# Patient Record
Sex: MALE | Race: ASIAN | Employment: FULL TIME | URBAN - METROPOLITAN AREA
[De-identification: names, ages, dates, MRNs, and addresses within clinical notes are randomized per-mention and may not be internally consistent; named-entity substitution may affect disease eponyms.]

---

## 2017-09-20 ENCOUNTER — OFFICE VISIT (OUTPATIENT)
Dept: FAMILY MEDICINE CLINIC | Age: 27
End: 2017-09-20

## 2017-09-20 VITALS
OXYGEN SATURATION: 96 % | BODY MASS INDEX: 28.25 KG/M2 | TEMPERATURE: 99.1 F | HEART RATE: 116 BPM | HEIGHT: 67 IN | SYSTOLIC BLOOD PRESSURE: 122 MMHG | RESPIRATION RATE: 22 BRPM | WEIGHT: 180 LBS | DIASTOLIC BLOOD PRESSURE: 78 MMHG

## 2017-09-20 RX ORDER — DIPHENHYDRAMINE HCL 25 MG
25 CAPSULE ORAL
COMMUNITY

## 2017-09-20 RX ORDER — IBUPROFEN 600 MG/1
TABLET ORAL
COMMUNITY
End: 2019-05-21 | Stop reason: DRUGHIGH

## 2017-09-20 NOTE — PROGRESS NOTES
Alexis Serrano is a 32 y.o. male  1. Have you been to the ER, urgent care clinic or hospitalized since your last visit? NO.     2. Have you seen or consulted any other health care providers outside of the [address] [place] [address] since your last visit (Include any pap smears or colon screening)? NO      Do you have an Advanced Directive? NO    Would you like information on Advanced Directives?  NO

## 2019-05-21 ENCOUNTER — OFFICE VISIT (OUTPATIENT)
Dept: FAMILY MEDICINE CLINIC | Age: 29
End: 2019-05-21

## 2019-05-21 VITALS
DIASTOLIC BLOOD PRESSURE: 78 MMHG | RESPIRATION RATE: 16 BRPM | WEIGHT: 191 LBS | OXYGEN SATURATION: 98 % | SYSTOLIC BLOOD PRESSURE: 120 MMHG | BODY MASS INDEX: 29.98 KG/M2 | HEART RATE: 86 BPM | HEIGHT: 67 IN | TEMPERATURE: 98.4 F

## 2019-05-21 DIAGNOSIS — Z83.3 FAMILY HISTORY OF DIABETES MELLITUS (DM): ICD-10-CM

## 2019-05-21 DIAGNOSIS — J45.990 EXERCISE-INDUCED ASTHMA: Primary | ICD-10-CM

## 2019-05-21 DIAGNOSIS — R53.82 CHRONIC FATIGUE: ICD-10-CM

## 2019-05-21 DIAGNOSIS — Z83.438 FAMILY HISTORY OF HYPERLIPIDEMIA: ICD-10-CM

## 2019-05-21 DIAGNOSIS — Z87.892 HISTORY OF ANAPHYLAXIS: ICD-10-CM

## 2019-05-21 DIAGNOSIS — G89.29 CHRONIC MIDLINE LOW BACK PAIN WITHOUT SCIATICA: ICD-10-CM

## 2019-05-21 DIAGNOSIS — M54.50 CHRONIC MIDLINE LOW BACK PAIN WITHOUT SCIATICA: ICD-10-CM

## 2019-05-21 DIAGNOSIS — N52.9 ERECTILE DYSFUNCTION, UNSPECIFIED ERECTILE DYSFUNCTION TYPE: ICD-10-CM

## 2019-05-21 RX ORDER — ALBUTEROL SULFATE 90 UG/1
2 AEROSOL, METERED RESPIRATORY (INHALATION)
Qty: 1 INHALER | Refills: 0 | Status: SHIPPED | OUTPATIENT
Start: 2019-05-21

## 2019-05-21 RX ORDER — EPINEPHRINE 0.3 MG/.3ML
0.3 INJECTION SUBCUTANEOUS
Qty: 1 SYRINGE | Refills: 1 | Status: SHIPPED | OUTPATIENT
Start: 2019-05-21 | End: 2019-05-21

## 2019-05-21 RX ORDER — CYCLOBENZAPRINE HCL 10 MG
10 TABLET ORAL
Qty: 20 TAB | Refills: 0 | Status: SHIPPED | OUTPATIENT
Start: 2019-05-21 | End: 2022-05-18

## 2019-05-21 RX ORDER — FLUTICASONE PROPIONATE 50 MCG
2 SPRAY, SUSPENSION (ML) NASAL DAILY
Qty: 1 BOTTLE | Refills: 0 | Status: SHIPPED | OUTPATIENT
Start: 2019-05-21 | End: 2022-05-18

## 2019-05-21 RX ORDER — IBUPROFEN 800 MG/1
800 TABLET ORAL
Qty: 30 TAB | Refills: 0 | Status: SHIPPED | OUTPATIENT
Start: 2019-05-21

## 2019-05-21 NOTE — PATIENT INSTRUCTIONS
Low Back Pain: Exercises Your Care Instructions Here are some examples of typical rehabilitation exercises for your condition. Start each exercise slowly. Ease off the exercise if you start to have pain. Your doctor or physical therapist will tell you when you can start these exercises and which ones will work best for you. How to do the exercises Press-up 1. Lie on your stomach, supporting your body with your forearms. 2. Press your elbows down into the floor to raise your upper back. As you do this, relax your stomach muscles and allow your back to arch without using your back muscles. As your press up, do not let your hips or pelvis come off the floor. 3. Hold for 15 to 30 seconds, then relax. 4. Repeat 2 to 4 times. Alternate arm and leg (bird dog) exercise 1. Start on the floor, on your hands and knees. 2. Tighten your belly muscles. 3. Raise one leg off the floor, and hold it straight out behind you. Be careful not to let your hip drop down, because that will twist your trunk. 4. Hold for about 6 seconds, then lower your leg and switch to the other leg. 5. Repeat 8 to 12 times on each leg. 6. Over time, work up to holding for 10 to 30 seconds each time. 7. If you feel stable and secure with your leg raised, try raising the opposite arm straight out in front of you at the same time. Knee-to-chest exercise 1. Lie on your back with your knees bent and your feet flat on the floor. 2. Bring one knee to your chest, keeping the other foot flat on the floor (or keeping the other leg straight, whichever feels better on your lower back). 3. Keep your lower back pressed to the floor. Hold for at least 15 to 30 seconds. 4. Relax, and lower the knee to the starting position. 5. Repeat with the other leg. Repeat 2 to 4 times with each leg. 6. To get more stretch, put your other leg flat on the floor while pulling your knee to your chest. 
 
Curl-ups 1. Lie on the floor on your back with your knees bent at a 90-degree angle. Your feet should be flat on the floor, about 12 inches from your buttocks. 2. Cross your arms over your chest. If this bothers your neck, try putting your hands behind your neck (not your head), with your elbows spread apart. 3. Slowly tighten your belly muscles and raise your shoulder blades off the floor. 4. Keep your head in line with your body, and do not press your chin to your chest. 
5. Hold this position for 1 or 2 seconds, then slowly lower yourself back down to the floor. 6. Repeat 8 to 12 times. Pelvic tilt exercise 1. Lie on your back with your knees bent. 2. \"Brace\" your stomach. This means to tighten your muscles by pulling in and imagining your belly button moving toward your spine. You should feel like your back is pressing to the floor and your hips and pelvis are rocking back. 3. Hold for about 6 seconds while you breathe smoothly. 4. Repeat 8 to 12 times. Heel dig bridging 1. Lie on your back with both knees bent and your ankles bent so that only your heels are digging into the floor. Your knees should be bent about 90 degrees. 2. Then push your heels into the floor, squeeze your buttocks, and lift your hips off the floor until your shoulders, hips, and knees are all in a straight line. 3. Hold for about 6 seconds as you continue to breathe normally, and then slowly lower your hips back down to the floor and rest for up to 10 seconds. 4. Do 8 to 12 repetitions. Hamstring stretch in doorway 1. Lie on your back in a doorway, with one leg through the open door. 2. Slide your leg up the wall to straighten your knee. You should feel a gentle stretch down the back of your leg. 3. Hold the stretch for at least 15 to 30 seconds. Do not arch your back, point your toes, or bend either knee. Keep one heel touching the floor and the other heel touching the wall. 4. Repeat with your other leg. 5. Do 2 to 4 times for each leg. Hip flexor stretch 1. Kneel on the floor with one knee bent and one leg behind you. Place your forward knee over your foot. Keep your other knee touching the floor. 2. Slowly push your hips forward until you feel a stretch in the upper thigh of your rear leg. 3. Hold the stretch for at least 15 to 30 seconds. Repeat with your other leg. 4. Do 2 to 4 times on each side. Wall sit 1. Stand with your back 10 to 12 inches away from a wall. 2. Lean into the wall until your back is flat against it. 3. Slowly slide down until your knees are slightly bent, pressing your lower back into the wall. 4. Hold for about 6 seconds, then slide back up the wall. 5. Repeat 8 to 12 times. Follow-up care is a key part of your treatment and safety. Be sure to make and go to all appointments, and call your doctor if you are having problems. It's also a good idea to know your test results and keep a list of the medicines you take. Where can you learn more? Go to http://lissy-butch.info/. Enter D654 in the search box to learn more about \"Low Back Pain: Exercises. \" Current as of: September 20, 2018 Content Version: 11.9 © 8363-5369 [employer]. Care instructions adapted under license by [employer] (which disclaims liability or warranty for this information). If you have questions about a medical condition or this instruction, always ask your healthcare professional. [place] [address] any warranty or liability for your use of this information.

## 2019-05-21 NOTE — PROGRESS NOTES
1. Have you been to the ER, urgent care clinic since your last visit? Hospitalized since your last visit? No    2. Have you seen or consulted any other health care providers outside of the [address] [place] [address] since your last visit? Include any pap smears or colon screening.  No

## 2019-05-23 NOTE — PROGRESS NOTES
Nelson Schreiber, 29 y.o.,  male    SUBJECTIVE  Establish care    Chronic low back pain - on and off the past 5 years. Has about monthly dull midline ache. Then more severe ones that he has difficulty walking and radiating to either leg about few times a year, usually aggravated by lifting weights with work outs or poor sleep position. 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 Prn nsaids effective. He has discontinued weight lifting due to discomfort. He is pain free past few weeks. Denies paresthesia,weakness, incontinence    C/o chronic fatigue, for several months needs coffee to get through the day. Concerned about difficulty maintaining erection. He has am erections, no issues with ejaculation. He has a long term fiance. He denies sleep problems, mood changes. Eczema- chronic, following derm on dupixent    Asthma-worse during his childhood, has outgrown this throughout adult zamudio. 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 Current Outpatient Medications   Medication Sig Dispense Refill    fluticasone propionate (FLONASE) 50 mcg/actuation nasal spray 2 Sprays by Both Nostrils route daily. 1 Bottle 0    albuterol (PROVENTIL HFA, VENTOLIN HFA, PROAIR HFA) 90 mcg/actuation inhaler Take 2 Puffs by inhalation every four (4) hours as needed for Wheezing. 1 Inhaler 0    cyclobenzaprine (FLEXERIL) 10 mg tablet Take 1 Tab by mouth three (3) times daily as needed for Muscle Spasm(s). 20 Tab 0    ibuprofen (MOTRIN) 800 mg tablet Take 1 Tab by mouth every eight (8) hours as needed for Pain. 30 Tab 0    dupilumab (DUPIXENT) 300 mg/2 mL syrg by SubCUTAneous route.  Cetirizine (ZYRTEC) 10 mg cap Take  by mouth.  diphenhydrAMINE (BENADRYL) 25 mg capsule Take 25 mg by mouth every six (6) hours as needed.          Allergies Allergen Reactions    Nuts [place] Solum Nut] Anaphylaxis    Seafood Anaphylaxis       Past Medical History:   Diagnosis Date    Asthma     Cramps, muscle, general     Eczema     Fx 1st metacarp base-closed     bilateral    Sciatica        Social History     Socioeconomic History    Marital status: SINGLE     Spouse name: Not on file    Number of children: Not on file    Years of education: Not on file    Highest education level: Not on file   Occupational History    Not on file   Social Needs    Financial resource strain: Not on file    Food insecurity:     Worry: Not on file     Inability: Not on file    Transportation needs:     Medical: Not on file     Non-medical: Not on file   Tobacco Use    Smoking status: Never Smoker    Smokeless tobacco: Never Used   Substance and Sexual Activity    Alcohol use: Yes     Comment: casual drinker    Drug use: No    Sexual activity: Yes     Partners: Female     Birth control/protection: Condom   Lifestyle    Physical activity:     Days per week: Not on file     Minutes per session: Not on file    Stress: Not on file   Relationships    Social connections:     Talks on phone: Not on file     Gets together: Not on file     Attends Rastafari service: Not on file     Active member of club or organization: Not on file     Attends meetings of clubs or organizations: Not on file     Relationship status: Not on file    Intimate partner violence:     Fear of current or ex partner: Not on file     Emotionally abused: Not on file     Physically abused: Not on file     Forced sexual activity: Not on file   Other Topics Concern    Not on file   Social History Narrative    Not on file       Family History   Problem Relation Age of Onset    No Known Problems Mother     Diabetes Father     Hypertension Father          OBJECTIVE    Physical Exam:     Visit Vitals  /78 (BP 1 Location: Left arm, BP Patient Position: Sitting)   Pulse 86   Temp 98.4 °F (36.9 °C) (Oral) Resp 16   Ht 5' 6.5\" (1.689 m)   Wt 191 lb (86.6 kg)   SpO2 98%   BMI 30.37 kg/m²       General: alert, well-appearing, in no apparent distress or pain  Head: atraumatic. Non-tender maxillary and frontal sinuses  Eyes: Lids with no discharge, no matting, conjunctivae clear and non injected, full EOMs, PERLLA  Ears: pinna non-tender, external auditory canal patent, TM intact  Mouth/throat:tonsils non enlarged, pharynx non erythematous and no lesion, nasal mucosa normal  Neck: supple, no adenopathy palpated  CVS: normal rate, regular rhythm, distinct S1 and S2  Lungs:clear to ausculation bilaterally, no crackles, wheezing or rhonchi noted  Back: Lumbosacral spine area reveals no local tenderness or mass. DTR's, motor strength and sensation normal, Peripheral pulses are palpable. Abdomen: normoactive bowel sounds, soft, non-tender  Extremities: no edema, no cyanosis, MSK grossly normal  Skin: + multiple scaly patches  Psych:  mood and affect normal        ASSESSMENT/PLAN  Diagnoses and all orders for this visit:    1. Exercise-induced asthma  -     albuterol (PROVENTIL HFA, VENTOLIN HFA, PROAIR HFA) 90 mcg/actuation inhaler; Take 2 Puffs by inhalation every four (4) hours as needed for Wheezing. 2. History of anaphylaxis  -     EPINEPHrine (EPIPEN) 0.3 mg/0.3 mL injection; 0.3 mL by IntraMUSCular route once as needed for Anaphylaxis for up to 1 dose. 3. Erectile dysfunction, unspecified erectile dysfunction type  -     TESTOSTERONE, FREE & TOTAL; Future  -     LIPID PANEL; Future  -     HEMOGLOBIN A1C W/O EAG; Future  -     METABOLIC PANEL, COMPREHENSIVE; Future    4. Family history of hyperlipidemia  -     LIPID PANEL; Future    5. Family history of diabetes mellitus (DM)  -     HEMOGLOBIN A1C W/O EAG; Future  -     METABOLIC PANEL, COMPREHENSIVE; Future    6. Chronic fatigue  ?  Deconditioning  R/o organic causes  Advised daily exercise, including core strengthening  -     TESTOSTERONE, FREE & TOTAL; Future  -     TSH 3RD GENERATION; Future  -     VITAMIN D, 25 HYDROXY; Future  -     CBC WITH AUTOMATED DIFF; Future    7. Chronic midline low back pain without sciatica  Intermittent  ? myofascial type, given prn flexeril and ibuprofen when symptomatic  -     XR SPINE LUMB 2 OR 3 V; Future  -     REFERRAL TO SPINE SURGERY  -     fluticasone propionate (FLONASE) 50 mcg/actuation nasal spray; 2 Sprays by Both Nostrils route daily. -     cyclobenzaprine (FLEXERIL) 10 mg tablet; Take 1 Tab by mouth three (3) times daily as needed for Muscle Spasm(s). -     ibuprofen (MOTRIN) 800 mg tablet; Take 1 Tab by mouth every eight (8) hours as needed for Pain. Follow-up and Dispositions    · Return in about 1 month (around 6/18/2019), or if symptoms worsen or fail to improve. Patient understands plan of care. Patient has provided input and agrees with goals.

## 2019-06-05 ENCOUNTER — HOSPITAL ENCOUNTER (OUTPATIENT)
Dept: OTHER | Age: 29
Discharge: HOME | End: 2019-06-05
Payer: COMMERCIAL

## 2019-06-05 ENCOUNTER — HOSPITAL ENCOUNTER (OUTPATIENT)
Dept: LAB | Age: 29
Discharge: HOME | End: 2019-06-05
Payer: COMMERCIAL

## 2019-06-05 DIAGNOSIS — G89.29 CHRONIC MIDLINE LOW BACK PAIN WITHOUT SCIATICA: ICD-10-CM

## 2019-06-05 DIAGNOSIS — N52.9 ERECTILE DYSFUNCTION, UNSPECIFIED ERECTILE DYSFUNCTION TYPE: ICD-10-CM

## 2019-06-05 DIAGNOSIS — M54.50 CHRONIC MIDLINE LOW BACK PAIN WITHOUT SCIATICA: ICD-10-CM

## 2019-06-05 DIAGNOSIS — Z83.438 FAMILY HISTORY OF HYPERLIPIDEMIA: ICD-10-CM

## 2019-06-05 DIAGNOSIS — Z83.3 FAMILY HISTORY OF DIABETES MELLITUS (DM): ICD-10-CM

## 2019-06-05 DIAGNOSIS — R53.82 CHRONIC FATIGUE: ICD-10-CM

## 2019-06-05 LAB
25(OH)D3 SERPL-MCNC: 11 NG/ML (ref 30–100)
ALBUMIN SERPL-MCNC: 4.4 G/DL (ref 3.4–5)
ALBUMIN/GLOB SERPL: 1.2 {RATIO} (ref 0.8–1.7)
ALP SERPL-CCNC: 82 U/L (ref 45–117)
ALT SERPL-CCNC: 72 U/L (ref 16–61)
ANION GAP SERPL CALC-SCNC: 8 MMOL/L (ref 3–18)
AST SERPL-CCNC: 23 U/L (ref 15–37)
BASOPHILS # BLD: 0.1 K/UL (ref 0–0.1)
BASOPHILS NFR BLD: 1 % (ref 0–2)
BILIRUB SERPL-MCNC: 0.4 MG/DL (ref 0.2–1)
BUN SERPL-MCNC: 12 MG/DL (ref 7–18)
BUN/CREAT SERPL: 14 (ref 12–20)
CALCIUM SERPL-MCNC: 9.2 MG/DL (ref 8.5–10.1)
CHLORIDE SERPL-SCNC: 103 MMOL/L (ref 100–108)
CHOLEST SERPL-MCNC: 170 MG/DL
CO2 SERPL-SCNC: 31 MMOL/L (ref 21–32)
CREAT SERPL-MCNC: 0.88 MG/DL (ref 0.6–1.3)
DIFFERENTIAL METHOD BLD: ABNORMAL
EOSINOPHIL # BLD: 0.4 K/UL (ref 0–0.4)
EOSINOPHIL NFR BLD: 6 % (ref 0–5)
ERYTHROCYTE [DISTWIDTH] IN BLOOD BY AUTOMATED COUNT: 13.4 % (ref 11.6–14.5)
GLOBULIN SER CALC-MCNC: 3.8 G/DL (ref 2–4)
GLUCOSE SERPL-MCNC: 91 MG/DL (ref 74–99)
HBA1C MFR BLD: 6 % (ref 4.2–5.6)
HCT VFR BLD AUTO: 46.1 % (ref 36–48)
HDLC SERPL-MCNC: 51 MG/DL (ref 40–60)
HDLC SERPL: 3.3 {RATIO} (ref 0–5)
HGB BLD-MCNC: 15 G/DL (ref 13–16)
LDLC SERPL CALC-MCNC: 102.8 MG/DL (ref 0–100)
LIPID PROFILE,FLP: ABNORMAL
LYMPHOCYTES # BLD: 1.9 K/UL (ref 0.9–3.6)
LYMPHOCYTES NFR BLD: 26 % (ref 21–52)
MCH RBC QN AUTO: 27.9 PG (ref 24–34)
MCHC RBC AUTO-ENTMCNC: 32.5 G/DL (ref 31–37)
MCV RBC AUTO: 85.8 FL (ref 74–97)
MONOCYTES # BLD: 0.5 K/UL (ref 0.05–1.2)
MONOCYTES NFR BLD: 7 % (ref 3–10)
NEUTS SEG # BLD: 4.4 K/UL (ref 1.8–8)
NEUTS SEG NFR BLD: 60 % (ref 40–73)
PLATELET # BLD AUTO: 272 K/UL (ref 135–420)
PMV BLD AUTO: 10.5 FL (ref 9.2–11.8)
POTASSIUM SERPL-SCNC: 4.5 MMOL/L (ref 3.5–5.5)
PROT SERPL-MCNC: 8.2 G/DL (ref 6.4–8.2)
RBC # BLD AUTO: 5.37 M/UL (ref 4.7–5.5)
SODIUM SERPL-SCNC: 142 MMOL/L (ref 136–145)
TRIGL SERPL-MCNC: 81 MG/DL (ref ?–150)
TSH SERPL DL<=0.05 MIU/L-ACNC: 1.84 UIU/ML (ref 0.36–3.74)
VLDLC SERPL CALC-MCNC: 16.2 MG/DL
WBC # BLD AUTO: 7.3 K/UL (ref 4.6–13.2)

## 2019-06-05 PROCEDURE — 36415 COLL VENOUS BLD VENIPUNCTURE: CPT

## 2019-06-05 PROCEDURE — 80061 LIPID PANEL: CPT

## 2019-06-05 PROCEDURE — 82306 VITAMIN D 25 HYDROXY: CPT

## 2019-06-05 PROCEDURE — 72100 X-RAY EXAM L-S SPINE 2/3 VWS: CPT

## 2019-06-05 PROCEDURE — 84403 ASSAY OF TOTAL TESTOSTERONE: CPT

## 2019-06-05 PROCEDURE — 84443 ASSAY THYROID STIM HORMONE: CPT

## 2019-06-05 PROCEDURE — 83036 HEMOGLOBIN GLYCOSYLATED A1C: CPT

## 2019-06-05 PROCEDURE — 85025 COMPLETE CBC W/AUTO DIFF WBC: CPT

## 2019-06-05 PROCEDURE — 80053 COMPREHEN METABOLIC PANEL: CPT

## 2019-06-06 LAB
TESTOST FREE SERPL-MCNC: 12.5 PG/ML (ref 9.3–26.5)
TESTOST SERPL-MCNC: 297 NG/DL (ref 264–916)

## 2019-07-02 ENCOUNTER — OFFICE VISIT (OUTPATIENT)
Dept: FAMILY MEDICINE CLINIC | Age: 29
End: 2019-07-02

## 2019-07-02 VITALS
HEIGHT: 67 IN | TEMPERATURE: 98.1 F | WEIGHT: 194 LBS | SYSTOLIC BLOOD PRESSURE: 126 MMHG | RESPIRATION RATE: 16 BRPM | DIASTOLIC BLOOD PRESSURE: 76 MMHG | HEART RATE: 88 BPM | BODY MASS INDEX: 30.45 KG/M2 | OXYGEN SATURATION: 98 %

## 2019-07-02 DIAGNOSIS — R73.03 PREDIABETES: Primary | ICD-10-CM

## 2019-07-02 DIAGNOSIS — G89.29 CHRONIC MIDLINE LOW BACK PAIN WITHOUT SCIATICA: ICD-10-CM

## 2019-07-02 DIAGNOSIS — Z87.892 HISTORY OF ANAPHYLAXIS: ICD-10-CM

## 2019-07-02 DIAGNOSIS — M54.50 CHRONIC MIDLINE LOW BACK PAIN WITHOUT SCIATICA: ICD-10-CM

## 2019-07-02 DIAGNOSIS — J45.990 EXERCISE-INDUCED ASTHMA: ICD-10-CM

## 2019-07-02 DIAGNOSIS — H10.9 CONJUNCTIVITIS OF BOTH EYES, UNSPECIFIED CONJUNCTIVITIS TYPE: ICD-10-CM

## 2019-07-02 DIAGNOSIS — N52.9 ERECTILE DYSFUNCTION, UNSPECIFIED ERECTILE DYSFUNCTION TYPE: ICD-10-CM

## 2019-07-02 DIAGNOSIS — E55.9 VITAMIN D DEFICIENCY: ICD-10-CM

## 2019-07-02 RX ORDER — ERGOCALCIFEROL 1.25 MG/1
50000 CAPSULE ORAL
Qty: 12 CAP | Refills: 0 | Status: SHIPPED | OUTPATIENT
Start: 2019-07-02 | End: 2019-07-02 | Stop reason: SDUPTHER

## 2019-07-02 RX ORDER — TOBRAMYCIN AND DEXAMETHASONE 3; 1 MG/ML; MG/ML
SUSPENSION/ DROPS OPHTHALMIC
Refills: 0 | COMMUNITY
Start: 2019-05-29 | End: 2022-05-18

## 2019-07-02 RX ORDER — ERGOCALCIFEROL 1.25 MG/1
50000 CAPSULE ORAL
Qty: 12 CAP | Refills: 0 | Status: SHIPPED | OUTPATIENT
Start: 2019-07-02 | End: 2022-05-18

## 2019-07-02 RX ORDER — SILDENAFIL 50 MG/1
50 TABLET, FILM COATED ORAL AS NEEDED
Qty: 12 TAB | Refills: 1 | Status: SHIPPED | OUTPATIENT
Start: 2019-07-02 | End: 2022-05-18

## 2019-07-02 NOTE — PROGRESS NOTES
Hugo Monterroso, 29 y.o.,  male    SUBJECTIVE  Ff-up 2nd visit    Reviewed labs c/w prediabetes- he reports poor diet, high carb ramen/rice. He is sedentary, mostly due to back pain. Chronic low back pain - on and off the past 5 years. Has about monthly dull midline ache. Then more severe ones that he has difficulty walking and radiating to either leg about few times a year, usually aggravated by lifting weights with work outs or poor sleep position. 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 Prn nsaids effective. He has discontinued weight lifting due to discomfort. He is pain free past few weeks. Denies paresthesia,weakness, incontinence  Xray shows mild degenerative changes l4-l5 and l5-s1, he has upcoming appt with dr. Jose Kay in august.     C/o chronic fatigue, for several months needs coffee to get through the day. Concerned about difficulty maintaining erection. He has am erections, no issues with ejaculation. He has a long term fiance. He denies sleep problems, mood changes. Vit D noted to be low, normal cbc/thyroid    Eczema- chronic, following derm on dupixent    Asthma-worse during his childhood, has outgrown this throughout adult zamudio. 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 No deep eye pain, no discharge. ROS:  See HPI, all others negative        There is no problem list on file for this patient.       Current Outpatient Medications   Medication Sig Dispense Refill    tobramycin-dexamethasone (TOBRADEX) ophthalmic suspension PLACE 1 DROP IN BOTH EYES 3 TIMES DAILY X 3 DAYS, THEN 1 DROP IN BOTH EYES TWICE DAILY X 3 DAYS  0    sildenafil citrate (VIAGRA) 50 mg tablet Take 1 Tab by mouth as needed (ED). 12 Tab 1    ergocalciferol (ERGOCALCIFEROL) 50,000 unit capsule Take 1 Cap by mouth every seven (7) days. 12 Cap 0    albuterol (PROVENTIL HFA, VENTOLIN HFA, PROAIR HFA) 90 mcg/actuation inhaler Take 2 Puffs by inhalation every four (4) hours as needed for Wheezing. 1 Inhaler 0    dupilumab (DUPIXENT) 300 mg/2 mL syrg by SubCUTAneous route.  Cetirizine (ZYRTEC) 10 mg cap Take  by mouth.  diphenhydrAMINE (BENADRYL) 25 mg capsule Take 25 mg by mouth every six (6) hours as needed.  fluticasone propionate (FLONASE) 50 mcg/actuation nasal spray 2 Sprays by Both Nostrils route daily. 1 Bottle 0    cyclobenzaprine (FLEXERIL) 10 mg tablet Take 1 Tab by mouth three (3) times daily as needed for Muscle Spasm(s). 20 Tab 0    ibuprofen (MOTRIN) 800 mg tablet Take 1 Tab by mouth every eight (8) hours as needed for Pain.  30 Tab 0       Allergies   Allergen Reactions    Nuts [Tree Nut] Anaphylaxis    Seafood Anaphylaxis       Past Medical History:   Diagnosis Date    Asthma     Cramps, muscle, general     Eczema     Fx 1st metacarp base-closed     bilateral    Sciatica        Social History     Socioeconomic History    Marital status: SINGLE     Spouse name: Not on file    Number of children: Not on file    Years of education: Not on file    Highest education level: Not on file   Occupational History    Not on file   Social Needs    Financial resource strain: Not on file    Food insecurity:     Worry: Not on file     Inability: Not on file    Transportation needs:     Medical: Not on file     Non-medical: Not on file   Tobacco Use    Smoking status: Never Smoker    Smokeless tobacco: Never Used   Substance and Sexual Activity    Alcohol use: Yes     Comment: casual drinker    Drug use: No    Sexual activity: Yes     Partners: Female     Birth control/protection: Condom   Lifestyle    Physical activity:     Days per week: Not on file     Minutes per session: Not on file    Stress: Not on file   Relationships    Social connections:     Talks on phone: Not on file     Gets together: Not on file     Attends Rastafari service: Not on file     Active member of club or organization: Not on file     Attends meetings of clubs or organizations: Not on file     Relationship status: Not on file    Intimate partner violence:     Fear of current or ex partner: Not on file     Emotionally abused: Not on file     Physically abused: Not on file     Forced sexual activity: Not on file   Other Topics Concern    Not on file   Social History Narrative    Not on file       Family History   Problem Relation Age of Onset    No Known Problems Mother     Diabetes Father     Hypertension Father          OBJECTIVE    Physical Exam:     Visit Vitals  /76 (BP 1 Location: Left arm, BP Patient Position: Sitting)   Pulse 88   Temp 98.1 °F (36.7 °C) (Oral)   Resp 16   Ht 5' 6.5\" (1.689 m)   Wt 194 lb (88 kg)   SpO2 98%   BMI 30.84 kg/m²       General: alert, well-appearing, in no apparent distress or pain  Head: atraumatic.  Non-tender maxillary and frontal sinuses  Eyes: Lids with no discharge, no matting, conjunctivae b/l injected ,  full EOMs, PERLLA  Ears: pinna non-tender, external auditory canal patent, TM intact  Mouth/throat:tonsils non enlarged, pharynx non erythematous and no lesion, nasal mucosa normal  Neck: supple, no adenopathy palpated  CVS: normal rate, regular rhythm, distinct S1 and S2  Lungs:clear to ausculation bilaterally, no crackles, wheezing or rhonchi  Skin: + multiple scaly patches  Psych:  mood and affect normal    Results for orders placed or performed during the hospital encounter of 06/05/19   CBC WITH AUTOMATED DIFF   Result Value Ref Range    WBC 7.3 4.6 - 13.2 K/uL    RBC 5.37 4.70 - 5.50 M/uL    HGB 15.0 13.0 - 16.0 g/dL    HCT 46.1 36.0 - 48.0 %    MCV 85.8 74.0 - 97.0 FL    MCH 27.9 24.0 - 34.0 PG    MCHC 32.5 31.0 - 37.0 g/dL    RDW 13.4 11.6 - 14.5 %    PLATELET 427 135 - 420 K/uL    MPV 10.5 9.2 - 11.8 FL    NEUTROPHILS 60 40 - 73 %    LYMPHOCYTES 26 21 - 52 %    MONOCYTES 7 3 - 10 %    EOSINOPHILS 6 (H) 0 - 5 %    BASOPHILS 1 0 - 2 %    ABS. NEUTROPHILS 4.4 1.8 - 8.0 K/UL    ABS. LYMPHOCYTES 1.9 0.9 - 3.6 K/UL    ABS. MONOCYTES 0.5 0.05 - 1.2 K/UL    ABS. EOSINOPHILS 0.4 0.0 - 0.4 K/UL    ABS. BASOPHILS 0.1 0.0 - 0.1 K/UL    DF AUTOMATED     LIPID PANEL   Result Value Ref Range    LIPID PROFILE          Cholesterol, total 170 <200 MG/DL    Triglyceride 81 <150 MG/DL    HDL Cholesterol 51 40 - 60 MG/DL    LDL, calculated 102.8 (H) 0 - 100 MG/DL    VLDL, calculated 16.2 MG/DL    CHOL/HDL Ratio 3.3 0 - 5.0     METABOLIC PANEL, COMPREHENSIVE   Result Value Ref Range    Sodium 142 136 - 145 mmol/L    Potassium 4.5 3.5 - 5.5 mmol/L    Chloride 103 100 - 108 mmol/L    CO2 31 21 - 32 mmol/L    Anion gap 8 3.0 - 18 mmol/L    Glucose 91 74 - 99 mg/dL    BUN 12 7.0 - 18 MG/DL    Creatinine 0.88 0.6 - 1.3 MG/DL    BUN/Creatinine ratio 14 12 - 20      GFR est AA >60 >60 ml/min/1.73m2    GFR est non-AA >60 >60 ml/min/1.73m2    Calcium 9.2 8.5 - 10.1 MG/DL    Bilirubin, total 0.4 0.2 - 1.0 MG/DL    ALT (SGPT) 72 (H) 16 - 61 U/L    AST (SGOT) 23 15 - 37 U/L    Alk.  phosphatase 82 45 - 117 U/L    Protein, total 8.2 6.4 - 8.2 g/dL    Albumin 4.4 3.4 - 5.0 g/dL    Globulin 3.8 2.0 - 4.0 g/dL    A-G Ratio 1.2 0.8 - 1.7     TESTOSTERONE, FREE & TOTAL   Result Value Ref Range    Testosterone 297 264 - 916 ng/dL    Free testosterone (Direct) 12.5 9.3 - 26.5 pg/mL   TSH 3RD GENERATION   Result Value Ref Range    TSH 1.84 0.36 - 3.74 uIU/mL   VITAMIN D, 25 HYDROXY   Result Value Ref Range    Vitamin D 25-Hydroxy 11.0 (L) 30 - 100 ng/mL   HEMOGLOBIN A1C W/O EAG   Result Value Ref Range    Hemoglobin A1c 6.0 (H) 4.2 - 5.6 %         ASSESSMENT/PLAN  Diagnoses and all orders for this visit:    Prediabetes  Tlcs, monitoring  Aim for 10-20 lb weight loss, discussed strategies, meal prep/intermittent fasting  Check a1c/bmp in 3 months    Exercise-induced asthma  Controlled  Cont prn albuterol  Offered PCV vaccine he declines  Annual flu vaccine recommended  -     albuterol (PROVENTIL HFA, VENTOLIN HFA, PROAIR HFA) 90 mcg/actuation inhaler; Take 2 Puffs by inhalation every four (4) hours as needed for Wheezing. History of anaphylaxis  -     EPINEPHrine (EPIPEN) 0.3 mg/0.3 mL injection; 0.3 mL by IntraMUSCular route once as needed for Anaphylaxis for up to 1 dose. Erectile dysfunction, unspecified erectile dysfunction type  Testosterone levels and metabolic screen are normal  Trial viagra prn    Vitamin D deficiency  Replete  Ergocalciferol 50,000 iu weekly  Recheck vitamin D in 3 months    Chronic midline low back pain without sciatica  Intermittent  ? myofascial type, given prn flexeril and ibuprofen when symptomatic  DDD on xray  Keep appt with dr. Zeke Quiros in august    Conjunctivitis of both eyes, unspecified conjunctivitis type  Chronic  Referral to ophthalmology        Follow-up and Dispositions    · Return in about 3 months (around 10/2/2019), or if symptoms worsen or fail to improve. Patient understands plan of care. Patient has provided input and agrees with goals.

## 2019-07-02 NOTE — PATIENT INSTRUCTIONS

## 2019-08-06 ENCOUNTER — OFFICE VISIT (OUTPATIENT)
Dept: ORTHOPEDIC SURGERY | Age: 29
End: 2019-08-06

## 2019-08-06 VITALS
TEMPERATURE: 98.3 F | BODY MASS INDEX: 29.4 KG/M2 | HEART RATE: 82 BPM | WEIGHT: 194 LBS | DIASTOLIC BLOOD PRESSURE: 83 MMHG | SYSTOLIC BLOOD PRESSURE: 122 MMHG | HEIGHT: 68 IN | RESPIRATION RATE: 12 BRPM | OXYGEN SATURATION: 98 %

## 2019-08-06 DIAGNOSIS — M47.816 LUMBAR FACET ARTHROPATHY: Primary | ICD-10-CM

## 2019-08-06 DIAGNOSIS — M51.36 DDD (DEGENERATIVE DISC DISEASE), LUMBAR: ICD-10-CM

## 2019-08-06 DIAGNOSIS — M62.838 MUSCLE SPASM: ICD-10-CM

## 2019-08-06 NOTE — LETTER
NOTIFICATION OF RETURN TO WORK / SCHOOL 
 
8/6/2019 4:40 PM 
 
Mr. Blanco Perez [id number] [id number] [address] To Whom It May Concern: 
 
Hugo Roche is under the care of [facility]. We recommend that he have a sit/stand workstation to improve his lumbar spine pain If there are questions or concerns please have the patient contact our office. Sincerely, Charanjit Hassan MD

## 2019-08-06 NOTE — LETTER
8/9/19 Patient: Jigna Thurman YOB: 1990 Date of Visit: 8/6/2019 Benny Person, [address] [id number] [place] [id number] VIA In Basket Dear Benny Person MD, Thank you for referring Mr. Jose Juan Pepper to [address] [place] [address] for evaluation. My notes for this consultation are attached. If you have questions, please do not hesitate to call me. I look forward to following your patient along with you. Sincerely, Renetta Cardona MD

## 2019-08-06 NOTE — PROGRESS NOTES
[facility]  [address] [place], [address]  Phone: [phone]  Fax: [phone]    NEW PATIENT  Pt's YOB: 1990    ASSESSMENT   Diagnoses and all orders for this visit:    1. Lumbar facet arthropathy    2. Muscle spasm    3. DDD (degenerative disc disease), lumbar         IMPRESSION AND PLAN:  Lyndon Katz is a 29 y.o. male with history of low back pain x ~5 years. Pt complains of dull aching lower back pain that occasionally radiates down the left leg. He has not attended physical therapy and is taking ibuprofen as needed. 1) Pt was given information on lumbar arthritis and herniated disc exercises. 2) He was offered a referral to [facility] physical therapy but declined. He wishes to try home exercise first.   3) Pt was given a work note allowing for a sit-stand work station. 4) He may use an inversion table if needed. 5) Pt was counseled on proper lifting mechanics. 6) I recommended the Pt try water exercise and beginner's yoga. 7) I encouraged the patient to exercise regularly, 30 minutes a day, 5 days a week. 8) Mr. Lindsey Osman has a reminder for a \"due or due soon\" health maintenance. I have asked that he contact his primary care provider, Rocky Harris MD, for follow-up on this health maintenance. 9)  demonstrated consistency with prescribing. Follow-up and Dispositions    · Return in about 6 weeks (around 9/17/2019), or if symptoms worsen or fail to improve. HISTORY OF PRESENT ILLNESS:  Hugo Tang is a 29 y.o. male with history of low back pain x ~5 years. Pt presents to the office today as a new patient referred by Rocky Harris MD. Pt complains of dull aching lower back pain that occasionally radiates down the left leg. He reports occasional pain in the left foot when sitting in certain positions.  Pt denies any weakness in the legs, loss of bowel/bladder control, or issues with balance. He reports that he has found himself leaning to the left when walking. Pt suspects that his pain may be related to an exercise injury. He has not attended physical therapy and is taking ibuprofen as needed. He has been prescribed Flexeril by Nidia Bolton MD but admits that he does not take the medication. Pt uses topical steroids for dermatitis. He notes that he recently moved and now has access to a pool. 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 He notes that he changes positions every hour and occasionally stretches as needed. Pt reports stomach cramping when performing 10 minute yoga YouTube exercise videos. Pt at this time desires to proceed with home exercises.       Pain Scale: 4/10     PCP: Nidia Bolton MD    Past Medical History:   Diagnosis Date    Asthma     Cramps, muscle, general     Eczema     Fx 1st metacarp base-closed     bilateral    Sciatica         Social History     Socioeconomic History    Marital status: SINGLE     Spouse name: Not on file    Number of children: Not on file    Years of education: Not on file    Highest education level: Not on file   Occupational History    Not on file   Social Needs    Financial resource strain: Not on file    Food insecurity:     Worry: Not on file     Inability: Not on file    Transportation needs:     Medical: Not on file     Non-medical: Not on file   Tobacco Use    Smoking status: Never Smoker    Smokeless tobacco: Never Used   Substance and Sexual Activity    Alcohol use: Yes     Comment: casual drinker    Drug use: No    Sexual activity: Yes     Partners: Female     Birth control/protection: Condom   Lifestyle    Physical activity:     Days per week: Not on file     Minutes per session: Not on file    Stress: Not on file   Relationships    Social connections:     Talks on phone: Not on file     Gets together: Not on file     Attends [employer] service: Not on file Active member of club or organization: Not on file     Attends meetings of clubs or organizations: Not on file     Relationship status: Not on file    Intimate partner violence:     Fear of current or ex partner: Not on file     Emotionally abused: Not on file     Physically abused: Not on file     Forced sexual activity: Not on file   Other Topics Concern     Service Not Asked    Blood Transfusions Not Asked    Caffeine Concern Not Asked    Occupational Exposure Not Asked   [place] Hazards Not Asked    Sleep Concern Not Asked    Stress Concern Not Asked    Weight Concern Not Asked    Special Diet Not Asked    Back Care Not Asked    Exercise Not Asked    Bike Helmet Not Asked    Seat Belt Not Asked    Self-Exams Not Asked   Social History Narrative    Not on file       Current Outpatient Medications   Medication Sig Dispense Refill    tobramycin-dexamethasone (TOBRADEX) ophthalmic suspension PLACE 1 DROP IN BOTH EYES 3 TIMES DAILY X 3 DAYS, THEN 1 DROP IN BOTH EYES TWICE DAILY X 3 DAYS  0    sildenafil citrate (VIAGRA) 50 mg tablet Take 1 Tab by mouth as needed (ED). 12 Tab 1    albuterol (PROVENTIL HFA, VENTOLIN HFA, PROAIR HFA) 90 mcg/actuation inhaler Take 2 Puffs by inhalation every four (4) hours as needed for Wheezing. 1 Inhaler 0    ibuprofen (MOTRIN) 800 mg tablet Take 1 Tab by mouth every eight (8) hours as needed for Pain. 30 Tab 0    dupilumab (DUPIXENT) 300 mg/2 mL syrg 300 mg by SubCUTAneous route every fourteen (14) days.  Cetirizine (ZYRTEC) 10 mg cap Take 1 Tab by mouth daily as needed.  diphenhydrAMINE (BENADRYL) 25 mg capsule Take 25 mg by mouth every six (6) hours as needed.  ergocalciferol (ERGOCALCIFEROL) 50,000 unit capsule Take 1 Cap by mouth every seven (7) days. (Patient not taking: Reported on 8/6/2019) 12 Cap 0    fluticasone propionate (FLONASE) 50 mcg/actuation nasal spray 2 Sprays by Both Nostrils route daily.  (Patient not taking: Reported on 8/6/2019) 1 Bottle 0    cyclobenzaprine (FLEXERIL) 10 mg tablet Take 1 Tab by mouth three (3) times daily as needed for Muscle Spasm(s). (Patient not taking: Reported on 8/6/2019) 20 Tab 0       Allergies   Allergen Reactions    Nuts [Tree Nut] Anaphylaxis    Seafood Anaphylaxis       REVIEW OF SYSTEMS    Constitutional: Negative for fever, chills, or weight change. Respiratory: Negative for cough or shortness of breath. Cardiovascular: Negative for chest pain or palpitations. Gastrointestinal: Negative for acid reflux, change in bowel habits, or constipation. Genitourinary: Negative for dysuria and flank pain. Musculoskeletal: Positive for lumbar pain. Skin: Negative for rash. Neurological: Negative for headaches, dizziness, or numbness. Endo/Heme/Allergies: Negative for increased bruising. Psychiatric/Behavioral: Negative for difficulty with sleep. PHYSICAL EXAMINATION  Visit Vitals  /83   Pulse 82   Temp 98.3 °F (36.8 °C) (Oral)   Resp 12   Ht 5' 8\" (1.727 m)   Wt 194 lb (88 kg)   SpO2 98%   BMI 29.50 kg/m²       Constitutional: Awake, alert, and in no acute distress. HEENT: Normocephalic. Atraumatic. Oropharynx is moist and clear. PERRL. EOMI. Sclerae are nonicteric  Cardiovascular: Regular rate and rhythm  Lungs: Clear to auscultation bilaterally  Abdomen: Soft and nontender. Bowel sounds are present  Neurological: 1+ symmetrical DTRs in the upper extremities. 1+ symmetrical DTRs in the lower extremities. Sensation to light touch is intact. Negative Clemons's sign bilaterally. Skin: warm, dry, and intact. Musculoskeletal: Good range of motion with side to side cervical flexion. No tenderness to palpation across the trapezius bilaterally. Tenderness to palpation in the lumbar region. No tenderness over the sacroiliac joints. Improvement with extension and axial loading. Pain with forward flexion. No pain with internal or external rotation of his hips.  Negative MALINI's test bilaterally. Negative straight leg raise bilaterally. No pain with heel or toe walking. No difficulty with the single leg stand bilaterally. Biceps  Triceps Deltoids Wrist Ext Wrist Flex Hand Intrin   Right +4/5 +4/5 +4/5 +4/5 +4/5 +4/5   Left +4/5 +4/5 +4/5 +4/5 +4/5 +4/5      Hip Flex  Quads Hamstrings Ankle DF EHL Ankle PF   Right +4/5 +4/5 +4/5 +4/5 +4/5 +4/5   Left +4/5 +4/5 +4/5 +4/5 +4/5 +4/5     IMAGING:    Lumbar spine x-rays from 06/05/2019 were personally reviewed with the patient and demonstrated:  Results from Hospital Encounter encounter on 06/05/19   XR SPINE LUMB 2 OR 3 V    Narrative EXAM: 3 radiographic views of the lumbar spine -- frontal, lateral lumbar, and  lateral lumbosacral.    INDICATION: Lumbar spine region pain. COMPARISON: None.    _______________    FINDINGS:       SEGMENTAL DESIGNATION:  There are 5 lumbar segments. The L1 vertebra has a  small rib on the left. SPINAL CURVATURE:  Marked hypolordosis. No scoliosis. MALALIGNMENT AT NEUTRAL:  None significant. COMPRESSION FRACTURES:  None detected, but many are inconspicuous  radiographically. DEGENERATIVE ENDPLATE CHANGES:  Minimal.     DISC HEIGHT LOSS:  Mild at L4-L5 and L5-S1. FACET ARTHROSIS:  Low-grade L5-S1. BAASTRUP-TYPE INTERSPINOUS PROCESS ARTHROSIS:  None significant.    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 _______________                   Written by Marcela Faulkner, as dictated by Pao Richard MD.  I, Dr. Pao Richard confirm that all documentation is accurate.

## 2019-08-06 NOTE — PATIENT INSTRUCTIONS
Low Back Arthritis: Exercises Introduction Here are some examples of typical rehabilitation exercises for your condition. Start each exercise slowly. Ease off the exercise if you start to have pain. Your doctor or physical therapist will tell you when you can start these exercises and which ones will work best for you. When you are not being active, find a comfortable position for rest. Some people are comfortable on the floor or a medium-firm bed with a small pillow under their head and another under their knees. Some people prefer to lie on their side with a pillow between their knees. Don't stay in one position for too long. Take short walks (10 to 20 minutes) every 2 to 3 hours. Avoid slopes, hills, and stairs until you feel better. Walk only distances you can manage without pain, especially leg pain. How to do the exercises Pelvic tilt 1. Lie on your back with your knees bent. 2. \"Brace\" your stomachtighten your muscles by pulling in and imagining your belly button moving toward your spine. 3. Press your lower back into the floor. You should feel your hips and pelvis rock back. 4. Hold for 6 seconds while breathing smoothly. 5. Relax and allow your pelvis and hips to rock forward. 6. Repeat 8 to 12 times. Back stretches 1. Get down on your hands and knees on the floor. 2. Relax your head and allow it to droop. Round your back up toward the ceiling until you feel a nice stretch in your upper, middle, and lower back. Hold this stretch for as long as it feels comfortable, or about 15 to 30 seconds. 3. Return to the starting position with a flat back while you are on your hands and knees. 4. Let your back sway by pressing your stomach toward the floor. Lift your buttocks toward the ceiling. 5. Hold this position for 15 to 30 seconds. 6. Repeat 2 to 4 times. Follow-up care is a key part of your treatment and safety.  Be sure to make and go to all appointments, and call your doctor if you are having problems. It's also a good idea to know your test results and keep a list of the medicines you take. Where can you learn more? Go to http://lissy-butch.info/. Enter R812 in the search box to learn more about \"Low Back Arthritis: Exercises. \" Current as of: September 20, 2018 Content Version: 12.1 © 0177-6541 [employer]. Care instructions adapted under license by [employer] (which disclaims liability or warranty for this information). If you have questions about a medical condition or this instruction, always ask your healthcare professional. [address] any warranty or liability for your use of this information. Herniated Disc: Exercises Introduction Here are some examples of exercises for you to try. The exercises may be suggested for a condition or for rehabilitation. Start each exercise slowly. Ease off the exercises if you start to have pain. You will be told when to start these exercises and which ones will work best for you. How to stay safe These exercises can help you move easier and feel better. But when you first start doing them, you may have more pain in your back. This is normal. But it is important to pay close attention to your pain during and after each exercise. · Keep doing these exercises if your pain stays the same or moves from your leg and buttock more toward the middle of your spine. Pain moving out of your leg and buttock is a good sign. · Stop doing these exercises if your pain gets worse in your leg and buttock. Stop if you start to have pain in your leg and buttock that you didn't have before. Be sure to do these exercises in the order they appear. Note how your pain changes before you move to the next one. If your pain is much worse right after exercise and stays worse the next day, do not do any of these exercises. 1. Rest on belly 1. Rest on belly 1. Lie on your stomach, with your head turned to the side. ? Keep your arms beside your body. ? If that position bothers your neck, place your hands, one on top of the other, underneath your forehead. This will help support your head and neck. 2. Try to relax your lower back muscles as much as you can. 3. Continue to lie on your stomach for 2 minutes. 2. Press-up back extension 1. Lie on your stomach, with your face down and your elbows tucked into your sides and under your shoulders. 2. Press your elbows down into the floor to raise your upper back. ? As you do this, relax your stomach muscles and allow your back to arch without using your back muscles. ? Let your low back relax completely as you arch up. Don't let your hips or pelvis come off the floor. 3. Hold this position for 15 to 30 seconds. Then relax, and return to the start position. Over time, work up to staying in the press-up position for up to 2 minutes. 4. Repeat 2 to 4 times. 3. Full press-up back extension 1. Lie on your stomach with your face down, keeping your elbows tucked into your sides and under your shoulders. 2. Straighten your elbows, and push your upper body up as far as you can. Allow your lower back to sag. Keep your hips, pelvis, and legs relaxed. 3. Hold this position for 5 seconds, and then relax. 4. Repeat 10 times. Each time, try to raise your upper body a little higher and hold your arms a bit straighter. 4. Backward bend 1. Stand with your feet hip-width apart, and don't lock your knees. Your toes should be pointing forward. 2. Place your hands in the small of your back. 3. Bend backward as far as you can, keeping your knees straight. Hold this position for 2 to 3 seconds. Then return to your starting position. 4. Repeat 2 to 4 times. Each time, try to bend backward a little farther, until you bend backward as far as you can. Follow-up care is a key part of your treatment and safety. Be sure to make and go to all appointments, and call your doctor if you are having problems. It's also a good idea to know your test results and keep a list of the medicines you take. Where can you learn more? Go to http://lissy-butch.info/. Enter 95915 [id number] in the search box to learn more about \"Herniated Disc: Exercises. \" Current as of: September 20, 2018 Content Version: 12.1 © 8776-3348 [employer]. Care instructions adapted under license by [employer] (which disclaims liability or warranty for this information). If you have questions about a medical condition or this instruction, always ask your healthcare professional. [address] any warranty or liability for your use of this information.

## 2020-09-03 ENCOUNTER — TELEPHONE (OUTPATIENT)
Dept: ORTHOPEDIC SURGERY | Age: 30
End: 2020-09-03

## 2020-09-03 ENCOUNTER — OFFICE VISIT (OUTPATIENT)
Dept: ORTHOPEDIC SURGERY | Age: 30
End: 2020-09-03

## 2020-09-03 VITALS
OXYGEN SATURATION: 100 % | RESPIRATION RATE: 16 BRPM | HEART RATE: 100 BPM | WEIGHT: 200 LBS | DIASTOLIC BLOOD PRESSURE: 96 MMHG | BODY MASS INDEX: 30.31 KG/M2 | HEIGHT: 68 IN | TEMPERATURE: 97.3 F | SYSTOLIC BLOOD PRESSURE: 150 MMHG

## 2020-09-03 DIAGNOSIS — M66.242 NONTRAUMATIC SAGITTAL BAND RUPTURE OF EXTENSOR TENDON, LEFT: Primary | ICD-10-CM

## 2020-09-03 DIAGNOSIS — M66.241 NONTRAUMATIC SAGITTAL BAND RUPTURE OF EXTENSOR TENDON, RIGHT: ICD-10-CM

## 2020-09-03 DIAGNOSIS — S63.92XA HAND SPRAIN, LEFT, INITIAL ENCOUNTER: ICD-10-CM

## 2020-09-03 DIAGNOSIS — S63.91XA HAND SPRAIN, RIGHT, INITIAL ENCOUNTER: ICD-10-CM

## 2020-09-03 NOTE — PROGRESS NOTES
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 Worker's Compensation and legal considerations: not known. Vitals:    09/03/20 1027 09/03/20 1029   BP: (!) 142/102 (!) 150/96   Pulse: 100    Resp: 16    Temp: 97.3 °F (36.3 °C)    SpO2: 100%    Weight: 200 lb (90.7 kg)    Height: 5' 8\" (1.727 m)    PainSc:   0 - No pain            Chief Complaint   Patient presents with    Hand Pain     Bilateral hand pain          HPI: Patient comes in today with complaints of bilateral hand pain and locking especially about the ring finger and little fingers worse on the left than the right. 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 Date of onset: Approximately July 2020    Injury: No    Prior Treatment:  No    Numbness/ Tingling: No      ROS: Review of Systems - General ROS: negative  Psychological ROS: negative  ENT ROS: negative  Allergy and Immunology ROS: negative  Hematological and Lymphatic ROS: negative  Respiratory ROS: no cough, shortness of breath, or wheezing  Cardiovascular ROS: no chest pain or dyspnea on exertion  Gastrointestinal ROS: no abdominal pain, change in bowel habits, or black or bloody stools  Musculoskeletal ROS: positive for - pain in hand - bilateral  Neurological ROS: negative  Dermatological ROS: negative    Past Medical History:   Diagnosis Date    Asthma     Cramps, muscle, general     Eczema     Fx 1st metacarp base-closed     bilateral    Sciatica        Past Surgical History:   Procedure Laterality Date    HX HEENT      [place] teeth extraction, x4       Current Outpatient Medications   Medication Sig Dispense Refill    sildenafil citrate (VIAGRA) 50 mg tablet Take 1 Tab by mouth as needed (ED). 12 Tab 1    ibuprofen (MOTRIN) 800 mg tablet Take 1 Tab by mouth every eight (8) hours as needed for Pain. 30 Tab 0    dupilumab (DUPIXENT) 300 mg/2 mL syrg 300 mg by SubCUTAneous route every fourteen (14) days.       Cetirizine (ZYRTEC) 10 mg cap Take 1 Tab by mouth daily as needed.  diphenhydrAMINE (BENADRYL) 25 mg capsule Take 25 mg by mouth every six (6) hours as needed.  tobramycin-dexamethasone (TOBRADEX) ophthalmic suspension PLACE 1 DROP IN BOTH EYES 3 TIMES DAILY X 3 DAYS, THEN 1 DROP IN BOTH EYES TWICE DAILY X 3 DAYS  0    ergocalciferol (ERGOCALCIFEROL) 50,000 unit capsule Take 1 Cap by mouth every seven (7) days. (Patient not taking: Reported on 8/6/2019) 12 Cap 0    fluticasone propionate (FLONASE) 50 mcg/actuation nasal spray 2 Sprays by Both Nostrils route daily. (Patient not taking: Reported on 8/6/2019) 1 Bottle 0    albuterol (PROVENTIL HFA, VENTOLIN HFA, PROAIR HFA) 90 mcg/actuation inhaler Take 2 Puffs by inhalation every four (4) hours as needed for Wheezing. 1 Inhaler 0    cyclobenzaprine (FLEXERIL) 10 mg tablet Take 1 Tab by mouth three (3) times daily as needed for Muscle Spasm(s). (Patient not taking: Reported on 8/6/2019) 20 Tab 0       Allergies   Allergen Reactions    Nuts Bonnita Rung Nut] Anaphylaxis    Seafood Anaphylaxis           PE:     Physical Exam       Bilateral hands: There is no tenderness to palpation or swelling noted about the MP joints of the ring finger and little finger bilaterally. There is a noticeable popping of the ring finger and little finger with flexion of the digits that looks like a semi-subluxation of the extensor tendons. Sensation is intact distally and cap refill is brisk. Imaging:     None indicated today        ICD-10-CM ICD-9-CM    1. Nontraumatic sagittal band rupture of extensor tendon, left  M66.242 727.63 AMB SUPPLY ORDER   2. Nontraumatic sagittal band rupture of extensor tendon, right  M66.241 727.63 AMB SUPPLY ORDER   3. Hand sprain, left, initial encounter  S63. 92XA 842.10 AMB SUPPLY ORDER   4. Hand sprain, right, initial encounter  S63. 91XA 842.10 AMB SUPPLY ORDER         Plan:     Bilateral TKO braces to be worn an ulnar gutter fashion for 6 weeks to be equivalent to a yoke splint to allow for decreased flexion of the MP joint while the sagittal bands heal.    Follow-up and Dispositions    · Return in about 6 weeks (around 10/15/2020) for Reevaluation and possible therapy.           Plan was reviewed with patient, who verbalized agreement and understanding of the plan

## 2020-09-03 NOTE — TELEPHONE ENCOUNTER
Informed patient he is able to come to office tomorrow to  office note that will include diagnoses of injury to provide to workers comp. Pt states he will come to the office tomorrow and sign medical release of records form. Pt acknowledges understanding. No further questions or concerns.

## 2020-09-03 NOTE — TELEPHONE ENCOUNTER
Patient would like to discuss options for Workers Compensation.  Please call patient at [phone]

## 2022-05-18 ENCOUNTER — OFFICE VISIT (OUTPATIENT)
Dept: ORTHOPEDIC SURGERY | Age: 32
End: 2022-05-18
Payer: COMMERCIAL

## 2022-05-18 VITALS
OXYGEN SATURATION: 96 % | HEART RATE: 74 BPM | WEIGHT: 185 LBS | BODY MASS INDEX: 28.04 KG/M2 | HEIGHT: 68 IN | TEMPERATURE: 97.5 F

## 2022-05-18 DIAGNOSIS — M72.2 PLANTAR FASCIITIS, BILATERAL: Primary | ICD-10-CM

## 2022-05-18 PROCEDURE — 99202 OFFICE O/P NEW SF 15 MIN: CPT | Performed by: PHYSICAL MEDICINE & REHABILITATION

## 2022-05-18 NOTE — PROGRESS NOTES
[address]  [address], [address]  [address]  Phone: [phone]  Fax: [phone]      Patient: Lm Connell                                                                              MRN: [id number]        YOB: 1990          AGE: 32 y.o. PCP: Arian Oconnor MD  Date:  05/18/22    Reason for Consultation: Foot Pain (bilateral)      HPI:  Lm Connell is a 32 y.o. male male carrier who presents with bilateral heel and foot pain. The symptoms began about 5 months ago. He denies any injury but he works long hours on his feet and around that time changed his shoes. He has tried Dr. Jarred Carnes insoles and stretching. He also has a night brace but gets numbness in his feet when he wears them. Neurologic symptoms: No numbness, tingling, weakness, bowel or bladder changes. No recent falls      Location: The pain is located in the heel and arch  Radiation: The pain does not radiate     Pain Score: Currently: 7/10  Quality: Pain is of a Cramping, Dull, Tight and Pulling quality. Aggravating: Pain is exacerbated by walking and standing  Alleviating: The pain is alleviated by sitting and lying down    Prior Treatments:  Tried insoles 1 month ago  Exercises stretch  Insoles    Previous Medications:   Current Medications:  Previous work-up has included:     Past Medical History:   Past Medical History:   Diagnosis Date    Asthma     Cramps, muscle, general     Eczema     Fx 1st metacarp base-closed     bilateral    Sciatica       Past Surgical History:   Past Surgical History:   Procedure Laterality Date    HX HEENT      Beacon teeth extraction, x4      SocHx:   Social History     Tobacco Use    Smoking status: Never Smoker    Smokeless tobacco: Never Used   Substance Use Topics    Alcohol use: Yes     Comment: casual drinker      FamHx:?    Family History   Problem Relation Age of Onset    No Known Problems Mother     Diabetes Father     Hypertension Father        Current Medications:    Current Outpatient Medications   Medication Sig Dispense Refill    albuterol (PROVENTIL HFA, VENTOLIN HFA, PROAIR HFA) 90 mcg/actuation inhaler Take 2 Puffs by inhalation every four (4) hours as needed for Wheezing. 1 Inhaler 0    ibuprofen (MOTRIN) 800 mg tablet Take 1 Tab by mouth every eight (8) hours as needed for Pain. 30 Tab 0    dupilumab (DUPIXENT) 300 mg/2 mL syrg 300 mg by SubCUTAneous route every fourteen (14) days.  Cetirizine (ZYRTEC) 10 mg cap Take 1 Tab by mouth daily as needed.  diphenhydrAMINE (BENADRYL) 25 mg capsule Take 25 mg by mouth every six (6) hours as needed.  tobramycin-dexamethasone (TOBRADEX) ophthalmic suspension PLACE 1 DROP IN BOTH EYES 3 TIMES DAILY X 3 DAYS, THEN 1 DROP IN BOTH EYES TWICE DAILY X 3 DAYS  0    sildenafil citrate (VIAGRA) 50 mg tablet Take 1 Tab by mouth as needed (ED). 12 Tab 1    ergocalciferol (ERGOCALCIFEROL) 50,000 unit capsule Take 1 Cap by mouth every seven (7) days. (Patient not taking: Reported on 8/6/2019) 12 Cap 0    fluticasone propionate (FLONASE) 50 mcg/actuation nasal spray 2 Sprays by Both Nostrils route daily. (Patient not taking: Reported on 8/6/2019) 1 Bottle 0    cyclobenzaprine (FLEXERIL) 10 mg tablet Take 1 Tab by mouth three (3) times daily as needed for Muscle Spasm(s). (Patient not taking: Reported on 8/6/2019) 20 Tab 0      Allergies:     Allergies   Allergen Reactions    Nuts [Tree Nut] Anaphylaxis    Seafood Anaphylaxis        Review of Systems:   Gen:    Denied fevers, chills, malaise, fatigue, weight changes   Resp: Denied shortness of breath, cough, wheezing   CVS: Denied chest pain, palpitations   : Denied urinary urgency, frequency, incontinence   GI: Denied nausea, vomiting, constipation, diarrhea   Skin: Denied rashes, wounds   Psych: Denied anxiety, depression   Vasc: Denied claudication, ulcers   Hem: Denied easy bruising/bleeding   MSK: See HPI   Neuro: See HPI         Physical Exam     Vital Signs:   Visit Vitals  Pulse 74   Temp 97.5 °F (36.4 °C) (Temporal)   Ht 5' 8\" (1.727 m)   Wt 185 lb (83.9 kg)   SpO2 96% Comment: RA   BMI 28.13 kg/m²      General: ??????? Well nourished and well developed male without any acute distress   Psychiatric: ?  Alert and oriented x 3 with normal mood    HEENT: ???????? Atraumatic   Respiratory:   Breathing non-labored and non dyspneic   CV: ???????????????? Peripheral pulses intact, no peripheral edema   Skin: ????????????? No rashes       Neurologic: ?? Sensation: normal and grossly intact thebilateral, lower extremity(s)  Strength: 5/5 in the bilateral, lower extremity(s)   Reflexes: reveals 2+ symmetric DTRs throughout  Gait: normal      Foot  B/l pes cavus  Tender medial calcaneus b/l  Tight gastroc/soleus, hamstrings   Negative straight leg    Medical Decision Making:    Images: The imaging results as well as the actual images of the studies below were reviewed, visualized and interpreted by me. Labs: The results below were reviewed.    Limited ultrasound -b/l plantar fascia thickening right 0.71 cm left 0.48cm- no apparent tear       Assessment:   - bilateral plantar fascitis   -pes cavus    Plan:      -Physical therapy -   Referral to [facility] for foot core program, gatroc soleus stretching, possible custom orthotic  -Discussed regular stretching, supportive foot wear, heel cup  -Discussed possible injection- would like to avoid corticosteroid injection  - Consider ECSW if available, tendon/fascia scraping  -f/u if pain does not improve         [facility]

## 2022-05-18 NOTE — PROGRESS NOTES
Sonyamckinley Esteves presents today for   Chief Complaint   Patient presents with    Foot Pain     bilateral       Is someone accompanying this pt? no    Is the patient using any DME equipment during OV? no    Depression Screening:  3 most recent PHQ Screens 9/3/2020   Little interest or pleasure in doing things Not at all   Feeling down, depressed, irritable, or hopeless Not at all   Total Score PHQ 2 0         Abuse Screening:  Abuse Screening Questionnaire 5/21/2019   Do you ever feel afraid of your partner? N   Are you in a relationship with someone who physically or mentally threatens you? N   Is it safe for you to go home? Y       Coordination of Care:  1. Have you been to the ER, urgent care clinic since your last visit? no  Hospitalized since your last visit? no    2. Have you seen or consulted any other health care providers outside of the [address] [place] [address] since your last visit? no Include any pap smears or colon screening.  no